# Patient Record
Sex: MALE | Race: WHITE
[De-identification: names, ages, dates, MRNs, and addresses within clinical notes are randomized per-mention and may not be internally consistent; named-entity substitution may affect disease eponyms.]

---

## 2021-03-28 ENCOUNTER — HOSPITAL ENCOUNTER (EMERGENCY)
Dept: HOSPITAL 76 - ED | Age: 21
Discharge: HOME | End: 2021-03-28
Payer: COMMERCIAL

## 2021-03-28 VITALS — DIASTOLIC BLOOD PRESSURE: 77 MMHG | SYSTOLIC BLOOD PRESSURE: 123 MMHG

## 2021-03-28 DIAGNOSIS — R10.31: Primary | ICD-10-CM

## 2021-03-28 LAB
ALBUMIN DIAFP-MCNC: 5.3 G/DL (ref 3.2–5.5)
ALBUMIN/GLOB SERPL: 1.8 {RATIO} (ref 1–2.2)
ALP SERPL-CCNC: 53 IU/L (ref 42–121)
ALT SERPL W P-5'-P-CCNC: 15 IU/L (ref 10–60)
ANION GAP SERPL CALCULATED.4IONS-SCNC: 13 MMOL/L (ref 6–13)
AST SERPL W P-5'-P-CCNC: 17 IU/L (ref 10–42)
BASOPHILS NFR BLD AUTO: 0 10^3/UL (ref 0–0.1)
BASOPHILS NFR BLD AUTO: 0.5 %
BILIRUB BLD-MCNC: 0.9 MG/DL (ref 0.2–1)
BUN SERPL-MCNC: 12 MG/DL (ref 6–20)
CALCIUM UR-MCNC: 10.2 MG/DL (ref 8.5–10.3)
CHLORIDE SERPL-SCNC: 101 MMOL/L (ref 101–111)
CLARITY UR REFRACT.AUTO: CLEAR
CO2 SERPL-SCNC: 26 MMOL/L (ref 21–32)
CREAT SERPLBLD-SCNC: 1 MG/DL (ref 0.6–1.2)
EOSINOPHIL # BLD AUTO: 0.1 10^3/UL (ref 0–0.7)
EOSINOPHIL NFR BLD AUTO: 1.4 %
ERYTHROCYTE [DISTWIDTH] IN BLOOD BY AUTOMATED COUNT: 11.2 % (ref 12–15)
GFRSERPLBLD MDRD-ARVRAT: 96 ML/MIN/{1.73_M2} (ref 89–?)
GLOBULIN SER-MCNC: 3 G/DL (ref 2.1–4.2)
GLUCOSE SERPL-MCNC: 105 MG/DL (ref 70–100)
GLUCOSE UR QL STRIP.AUTO: NEGATIVE MG/DL
HCT VFR BLD AUTO: 49.2 % (ref 42–52)
HGB UR QL STRIP: 16.8 G/DL (ref 14–18)
KETONES UR QL STRIP.AUTO: NEGATIVE MG/DL
LIPASE SERPL-CCNC: 30 U/L (ref 22–51)
LYMPHOCYTES # SPEC AUTO: 1.1 10^3/UL (ref 1.5–3.5)
LYMPHOCYTES NFR BLD AUTO: 25.6 %
MCH RBC QN AUTO: 31.6 PG (ref 27–31)
MCHC RBC AUTO-ENTMCNC: 34.1 G/DL (ref 32–36)
MCV RBC AUTO: 92.5 FL (ref 80–94)
MONOCYTES # BLD AUTO: 0.4 10^3/UL (ref 0–1)
MONOCYTES NFR BLD AUTO: 8.5 %
NEUTROPHILS # BLD AUTO: 2.7 10^3/UL (ref 1.5–6.6)
NEUTROPHILS # SNV AUTO: 4.2 X10^3/UL (ref 4.8–10.8)
NEUTROPHILS NFR BLD AUTO: 63.8 %
NITRITE UR QL STRIP.AUTO: NEGATIVE
NRBC # BLD AUTO: 0 /100WBC
NRBC # BLD AUTO: 0 X10^3/UL
PDW BLD AUTO: 8.8 FL (ref 7.4–11.4)
PH UR STRIP.AUTO: 6 PH (ref 5–7.5)
PLATELET # BLD: 221 10^3/UL (ref 130–450)
POTASSIUM SERPL-SCNC: 3.9 MMOL/L (ref 3.5–5)
PROT SPEC-MCNC: 8.3 G/DL (ref 6.7–8.2)
PROT UR STRIP.AUTO-MCNC: NEGATIVE MG/DL
RBC # UR STRIP.AUTO: NEGATIVE /UL
RBC MAR: 5.32 10^6/UL (ref 4.7–6.1)
SODIUM SERPLBLD-SCNC: 140 MMOL/L (ref 135–145)
SP GR UR STRIP.AUTO: 1.02 (ref 1–1.03)
SQUAMOUS URNS QL MICRO: (no result)
UROBILINOGEN UR QL STRIP.AUTO: (no result) E.U./DL
UROBILINOGEN UR STRIP.AUTO-MCNC: NEGATIVE MG/DL
WBC # UR MANUAL: (no result) /HPF (ref 0–3)

## 2021-03-28 PROCEDURE — 81001 URINALYSIS AUTO W/SCOPE: CPT

## 2021-03-28 PROCEDURE — 80053 COMPREHEN METABOLIC PANEL: CPT

## 2021-03-28 PROCEDURE — 99284 EMERGENCY DEPT VISIT MOD MDM: CPT

## 2021-03-28 PROCEDURE — 83690 ASSAY OF LIPASE: CPT

## 2021-03-28 PROCEDURE — 85025 COMPLETE CBC W/AUTO DIFF WBC: CPT

## 2021-03-28 PROCEDURE — 74177 CT ABD & PELVIS W/CONTRAST: CPT

## 2021-03-28 PROCEDURE — 87086 URINE CULTURE/COLONY COUNT: CPT

## 2021-03-28 PROCEDURE — 36415 COLL VENOUS BLD VENIPUNCTURE: CPT

## 2021-03-28 NOTE — CT REPORT
PROCEDURE:  Abdomen/Pelvis W

 

INDICATIONS:  Abdominal pain, acute, nonlocalized

 

TECHNIQUE:  

After the administration of intravenous contrast, 5 mm thick sections acquired from the diaphragms to
 the symphysis.  2.5 mm thick coronal and sagittal reformats were acquired.  Optional 10-minute delay
ed imaging may be performed from the kidneys to the bladder.  For radiation dose reduction, the follo
wing was used:  automated exposure control, adjustment of mA and/or kV according to patient size.  

 

COMPARISON:  None

 

FINDINGS:  

Image quality:  Excellent.  

 

ABDOMEN:  

Lung bases:  Lung bases are clear.  Heart size is normal.  No pericardial effusion.  Inferior ribs ar
e intact.  No basal pleural effusions or pneumothorax.  

 

Solid organs:  Liver is normal in size and enhancement, without lacerations.  Gallbladder wall does n
ot appear thickened.  .  Biliary system is non-dilated.  Pancreas enhances normally, without transect
ion.  Spleen is normal in size and enhancement, without lacerations.  No adrenal hematomas.  Both kid
neys enhance normally, without hydronephrosis or lacerations.  

 

Peritoneum and bowel:  No free fluid or air. No dilated loops of small bowel are seen. The small jovana
l loops demonstrate mild hyperenhancement. A normal appendix is seen. No focal right lower quadrant i
nflammatory changes are seen.

 

Nodes and vessels:  No retroperitoneal or mesenteric adenopathy.  Aorta and inferior vena cava are no
rmal in size and enhancement.  

 

Miscellaneous:  No ventral hernias.  

 

 

PELVIS:  

 

Genitourinary:  Bladder wall thickness is normal.  

 

Miscellaneous:  No inguinal hernias or adenopathy.  

 

Bones:  Pelvic ring and hip joints appear intact.  No vertebral compression fractures.    

 

 

 

 

IMPRESSION:  Mildly hyperenhancing small bowel loops, without dilatation. Please consider enteritis.

 

Normal appendix.

 

Reviewed by: Sergio Reynolds MD on 3/28/2021 10:42 AM ROBERTO

Approved by: Sergio Reynolds MD on 3/28/2021 10:42 AM AKKARI

 

 

Station ID:  [id number]

## 2021-03-28 NOTE — ED PHYSICIAN DOCUMENTATION
History of Present Illness





- Stated complaint


Stated Complaint: ABD PX





- Chief complaint


Chief Complaint: Abd Pain





- History obtained from


History obtained from: Patient





- Additonal information


Additional information: 





20-year-old man presents with dysuria over the past couple days associated with 

right lower quadrant pain for the past 4 to 5 days, intermittent, gradual onset,

worse with straining and with cough, associated with nausea.  Patient denies 

diarrhea, fever chills, back pain.  Patient is previously healthy. Denies sexual

activity or concerns for STI.  Denies frequency.





Review of Systems


Ten Systems: 10 systems reviewed and negative


Constitutional: denies: Fever


GI: reports: Abdominal Pain, Nausea.  denies: Vomiting, Constipation, Diarrhea


: reports: Dysuria.  denies: Frequency





PD PAST MEDICAL HISTORY





- Past Medical History


Past Medical History: No





- Present Medications


Home Medications: 


                                Ambulatory Orders











 Medication  Instructions  Recorded  Confirmed


 


Amox/Clav 875/125 [Augmentin 1 tab PO BID 03/28/21 03/28/21





875/125 Tab]   


 


Ondansetron Odt [Zofran Odt] 4 mg TL Q6H PRN #10 tablet 03/28/21 














- Allergies


Allergies/Adverse Reactions: 


                                    Allergies











Allergy/AdvReac Type Severity Reaction Status Date / Time


 


No Known Drug Allergies Allergy   Verified 03/28/21 09:33














- Social History


Does the pt smoke?: No


Smoking Status: Never smoker





PD ED PE NORMAL





- Vitals


Vital signs reviewed: Yes





- General


General: Alert and oriented X 3, No acute distress, Well developed/nourished





- HEENT


HEENT: Atraumatic, PERRL, EOMI





- Cardiac


Cardiac: RRR





- Respiratory


Respiratory: No respiratory distress, Clear bilaterally





- Abdomen


Abdomen: Non tender, Non distended





- Male 


Male : Chaperone present (RN), Other (Normal external male genitalia.)





- Rectal


Rectal: Deferred





- Derm


Derm: Normal color





- Extremities


Extremities: No deformity





- Neuro


Neuro: Alert and oriented X 3





- Psych


Psych: Normal mood, Normal affect





Results





- Vitals


Vitals: 


                               Vital Signs - 24 hr











  03/28/21 03/28/21





  09:32 12:25


 


Temperature 36.3 C L 37.3 C


 


Heart Rate 66 80


 


Respiratory 18 16





Rate  


 


Blood Pressure 139/74 H 123/77


 


O2 Saturation 100 100








                                     Oxygen











O2 Source                      Room air

















- Labs


Labs: 


                                Laboratory Tests











  03/28/21 03/28/21 03/28/21





  09:35 10:24 10:24


 


WBC   4.2 L 


 


RBC   5.32 


 


Hgb   16.8 


 


Hct   49.2 


 


MCV   92.5 


 


MCH   31.6 H 


 


MCHC   34.1 


 


RDW   11.2 L 


 


Plt Count   221 


 


MPV   8.8 


 


Neut # (Auto)   2.7 


 


Lymph # (Auto)   1.1 L 


 


Mono # (Auto)   0.4 


 


Eos # (Auto)   0.1 


 


Baso # (Auto)   0.0 


 


Absolute Nucleated RBC   0.00 


 


Nucleated RBC %   0.0 


 


Sodium    140


 


Potassium    3.9


 


Chloride    101


 


Carbon Dioxide    26


 


Anion Gap    13.0


 


BUN    12


 


Creatinine    1.0


 


Estimated GFR (MDRD)    96


 


Glucose    105 H


 


Calcium    10.2


 


Total Bilirubin    0.9


 


AST    17


 


ALT    15


 


Alkaline Phosphatase    53


 


Total Protein    8.3 H


 


Albumin    5.3


 


Globulin    3.0


 


Albumin/Globulin Ratio    1.8


 


Lipase    30


 


Urine Color  YELLOW  


 


Urine Clarity  CLEAR  


 


Urine pH  6.0  


 


Ur Specific Gravity  1.025  


 


Urine Protein  NEGATIVE  


 


Urine Glucose (UA)  NEGATIVE  


 


Urine Ketones  NEGATIVE  


 


Urine Occult Blood  NEGATIVE  


 


Urine Nitrite  NEGATIVE  


 


Urine Bilirubin  NEGATIVE  


 


Urine Urobilinogen  0.2 (NORMAL)  


 


Ur Leukocyte Esterase  NEGATIVE  


 


Urine RBC  None Seen  


 


Urine WBC  0-3  


 


Ur Squamous Epith Cells  NONE SEEN  


 


Urine Bacteria  None Seen  


 


Urine Culture Comments  NOT INDICATED  














PD MEDICAL DECISION MAKING





- ED course


ED course: 





20-year-old male presents with dysuria and right lower quadrant pain.  He does 

have a possible palpable hernia in the left inguinal area.  Will refer him to 

surgery clinic for that.  Because he is new to the area I am also giving him 

[facility] clinic.  Strict return precautions given.  





Departure





- Departure


Disposition: 01 Home, Self Care


Clinical Impression: 


 Abdominal pain





Condition: Good


Instructions:  ED Abdominal Pain Unkn Cause


Follow-Up: 


Fernando Corado MD [Provider Admit Priv/Credential] - 


Prescriptions: 


Ondansetron Odt [Zofran Odt] 4 mg TL Q6H PRN #10 tablet


 PRN Reason: Nausea / Vomiting


Comments: 


You were seen in the emergency room for abdominal pain.  Your lab work, urine 

and CT did not show an obvious cause for your pain.  It is possible that you 

have a inguinal hernia.  You should follow-up with your primary doctor to have 

this rechecked.  Your CT did not show evidence of it but if it continues to 

bother you then you may need to follow-up in surgery clinic to have it examined 

and potentially repaired.  Please return to the emergency department if you have

any new or worsening symptoms or other concerns.








WALK IN CLINIC:





[facility] 


[address] [place] [address]  ~7.1 mi 


[phone]











Discharge Date/Time: 03/28/21 12:45